# Patient Record
Sex: MALE | Race: WHITE | NOT HISPANIC OR LATINO | Employment: UNEMPLOYED | URBAN - METROPOLITAN AREA
[De-identification: names, ages, dates, MRNs, and addresses within clinical notes are randomized per-mention and may not be internally consistent; named-entity substitution may affect disease eponyms.]

---

## 2017-02-06 ENCOUNTER — TELEPHONE (OUTPATIENT)
Dept: NEUROSURGERY | Facility: CLINIC | Age: 32
End: 2017-02-06

## 2017-02-06 NOTE — TELEPHONE ENCOUNTER
----- Message from Barbie Guo sent at 2/6/2017 11:00 AM CST -----  Contact: pt [phone]  Pt would like to speak with nurse regarding his physical therapy and ct scan.pls call

## 2017-02-06 NOTE — TELEPHONE ENCOUNTER
Patient stated he's going to the emergency room is he feels like he cannot take his pain. Patient also would like a CT scan. Expressed to patient I will send a message to Luis Carlos and he will receive a call with Luis Carlos's decision. Patient verbalized understanding.

## 2017-02-09 ENCOUNTER — OFFICE VISIT (OUTPATIENT)
Dept: NEUROSURGERY | Facility: CLINIC | Age: 32
End: 2017-02-09
Payer: COMMERCIAL

## 2017-02-09 ENCOUNTER — HOSPITAL ENCOUNTER (OUTPATIENT)
Dept: RADIOLOGY | Facility: HOSPITAL | Age: 32
Discharge: HOME | End: 2017-02-09
Attending: NEUROLOGICAL SURGERY
Payer: COMMERCIAL

## 2017-02-09 VITALS
HEART RATE: 77 BPM | HEIGHT: 69 IN | DIASTOLIC BLOOD PRESSURE: 80 MMHG | WEIGHT: 185.63 LBS | SYSTOLIC BLOOD PRESSURE: 123 MMHG | BODY MASS INDEX: 27.49 KG/M2 | TEMPERATURE: 98 F

## 2017-02-09 DIAGNOSIS — M54.16 LUMBAR RADICULOPATHY, ACUTE: ICD-10-CM

## 2017-02-09 DIAGNOSIS — M62.838 MUSCLE SPASMS OF NECK: ICD-10-CM

## 2017-02-09 DIAGNOSIS — M47.22 CERVICAL SPONDYLOSIS WITH RADICULOPATHY: ICD-10-CM

## 2017-02-09 PROCEDURE — 1160F RVW MEDS BY RX/DR IN RCRD: CPT | Mod: S$GLB,,, | Performed by: PHYSICIAN ASSISTANT

## 2017-02-09 PROCEDURE — 71020 XR CHEST PA AND LATERAL: CPT | Mod: TC

## 2017-02-09 PROCEDURE — 99999 PR PBB SHADOW E&M-EST. PATIENT-LVL IV: CPT | Mod: PBBFAC,,, | Performed by: PHYSICIAN ASSISTANT

## 2017-02-09 PROCEDURE — 71020 XR CHEST PA AND LATERAL: CPT | Mod: 26,,, | Performed by: RADIOLOGY

## 2017-02-09 PROCEDURE — 99214 OFFICE O/P EST MOD 30 MIN: CPT | Mod: S$GLB,,, | Performed by: PHYSICIAN ASSISTANT

## 2017-02-09 RX ORDER — METHOCARBAMOL 750 MG/1
750 TABLET, FILM COATED ORAL 4 TIMES DAILY PRN
Qty: 60 TABLET | Refills: 0 | Status: SHIPPED | OUTPATIENT
Start: 2017-02-09 | End: 2018-01-15

## 2017-02-09 RX ORDER — HYDROCODONE BITARTRATE AND ACETAMINOPHEN 7.5; 325 MG/1; MG/1
1 TABLET ORAL EVERY 6 HOURS PRN
COMMUNITY
End: 2018-01-15

## 2017-02-09 NOTE — LETTER
February 13, 2017      Dave Gutierrez MD  [address] [facility] [address]           [facility] - Neurosurgery 7th Fl  [address]  [facility] [address]  Phone: [phone]          Patient: Keke Head   MR Number: [id number]   YOB: 1985   Date of Visit: 2/9/2017       Dear Dr. Dave Gutierrez:    Thank you for referring Keke Head to me for evaluation. Attached you will find relevant portions of my assessment and plan of care.    If you have questions, please do not hesitate to call me. I look forward to following Keke Head along with you.    Sincerely,    LEIGH Evans    Enclosure  CC:  No Recipients    If you would like to receive this communication electronically, please contact [email or web address] or [phone] to request more information on [employer] access.    For providers and/or their staff who would like to refer a patient to [facility], please contact us through our one-stop-shop provider referral line, [facility], at [phone].    If you feel you have received this communication in error or would no longer like to receive these types of communications, please e-mail [email or web address]
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
## 2017-02-22 ENCOUNTER — HOSPITAL ENCOUNTER (OUTPATIENT)
Dept: RADIOLOGY | Facility: HOSPITAL | Age: 32
Discharge: HOME | End: 2017-02-22
Attending: NEUROLOGICAL SURGERY
Payer: MEDICAID

## 2017-02-22 DIAGNOSIS — M47.22 CERVICAL SPONDYLOSIS WITH RADICULOPATHY: ICD-10-CM

## 2017-02-22 DIAGNOSIS — M54.16 LUMBAR RADICULOPATHY, ACUTE: ICD-10-CM

## 2017-02-22 PROCEDURE — 72131 CT LUMBAR SPINE W/O DYE: CPT | Mod: TC,PO

## 2017-02-22 PROCEDURE — 72125 CT NECK SPINE W/O DYE: CPT | Mod: TC,PO

## 2017-02-23 ENCOUNTER — TELEPHONE (OUTPATIENT)
Dept: NEUROSURGERY | Facility: CLINIC | Age: 32
End: 2017-02-23

## 2017-02-23 NOTE — TELEPHONE ENCOUNTER
I returned the pt wife call regarding imaging results that were completed on 2/22/17.  I informed  that the doctor has to review the imaging and findings . I also informed her that we generally do not give results over the phone. Once the physician reviews the imaging, the physician himself can release the results to be shown on [facility].  The pt wife stated they have not set up the account and verbally understands what was explained.

## 2017-02-23 NOTE — TELEPHONE ENCOUNTER
----- Message from Ted Gilman sent at 2/23/2017  9:19 AM CST -----  Contact: Lucia ( Spouse ) [phone]  Caller is calling to get the results of the CT, pls call

## 2017-03-09 ENCOUNTER — OFFICE VISIT (OUTPATIENT)
Dept: NEUROSURGERY | Facility: CLINIC | Age: 32
End: 2017-03-09
Payer: MEDICAID

## 2017-03-09 VITALS
HEIGHT: 69 IN | WEIGHT: 188 LBS | BODY MASS INDEX: 27.85 KG/M2 | SYSTOLIC BLOOD PRESSURE: 114 MMHG | DIASTOLIC BLOOD PRESSURE: 77 MMHG | HEART RATE: 66 BPM

## 2017-03-09 DIAGNOSIS — M62.838 MUSCLE SPASMS OF NECK: Primary | ICD-10-CM

## 2017-03-09 PROCEDURE — 99999 PR PBB SHADOW E&M-EST. PATIENT-LVL III: CPT | Mod: PBBFAC,,, | Performed by: PHYSICIAN ASSISTANT

## 2017-03-09 PROCEDURE — 99214 OFFICE O/P EST MOD 30 MIN: CPT | Mod: S$PBB,,, | Performed by: PHYSICIAN ASSISTANT

## 2017-03-09 PROCEDURE — 99213 OFFICE O/P EST LOW 20 MIN: CPT | Mod: PBBFAC | Performed by: PHYSICIAN ASSISTANT
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
## 2017-03-14 ENCOUNTER — CLINICAL SUPPORT (OUTPATIENT)
Dept: REHABILITATION | Facility: HOSPITAL | Age: 32
End: 2017-03-14
Attending: NEUROLOGICAL SURGERY
Payer: MEDICAID

## 2017-03-14 DIAGNOSIS — M54.50 BILATERAL LOW BACK PAIN WITHOUT SCIATICA, UNSPECIFIED CHRONICITY: ICD-10-CM

## 2017-03-14 DIAGNOSIS — M54.2 NECK PAIN, BILATERAL: Primary | ICD-10-CM

## 2017-03-14 PROCEDURE — 97110 THERAPEUTIC EXERCISES: CPT | Performed by: PHYSICAL MEDICINE & REHABILITATION

## 2017-03-14 PROCEDURE — 97162 PT EVAL MOD COMPLEX 30 MIN: CPT | Performed by: PHYSICAL MEDICINE & REHABILITATION
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
## 2017-03-21 ENCOUNTER — CLINICAL SUPPORT (OUTPATIENT)
Dept: REHABILITATION | Facility: HOSPITAL | Age: 32
End: 2017-03-21
Attending: NEUROLOGICAL SURGERY
Payer: MEDICAID

## 2017-03-21 DIAGNOSIS — M54.50 BILATERAL LOW BACK PAIN WITHOUT SCIATICA, UNSPECIFIED CHRONICITY: ICD-10-CM

## 2017-03-21 DIAGNOSIS — M54.2 NECK PAIN, BILATERAL: ICD-10-CM

## 2017-03-21 PROCEDURE — 97110 THERAPEUTIC EXERCISES: CPT | Performed by: PHYSICAL MEDICINE & REHABILITATION

## 2017-03-21 NOTE — PROGRESS NOTES
"Name: Keke Head  [facility] Number: [id number]  Diagnosis:   Encounter Diagnoses   Name Primary?    Neck pain, bilateral     Bilateral low back pain without sciatica, unspecified chronicity      Physician: Jayesh Pastor MD  Treatment Orders: PT Eval and Treat  Past Medical History:   Diagnosis Date    Neck pain        Precautions: None  Visit # authorized:  on 3/21/2017  Authorization period: 17  Plan of care expiration: 17    Start time: 4:15 PM  Stop Time: 5:00 PM    Timed     Procedure  Min     Units       TE   45 3                    Untimed     Procedure  Min  Units                      Subjective     Pt reports: Patient states low back feels good but c/o pain in center of neck C6-C7 area.  States did some yard work today.    Pain Scale: before treatment: 3/10 currently; after treatment: 2/10    Objective       TREATMENT   Therapeutic exercise: Keke received therapeutic exercises to develop flexibility, strength, and ROM for 45 minutes includin:1 with PT    Date 3/21/17  3/14/17   Visit     POC Exp Date  17   Face to Face      UT (S) (B)  2 x 30"  2 x 30"   Levator Scap (S) (B) 2 x 30" 2 x 30"   Supine Clasped hand (S)  10 x 5"   10 x 5"   Scap Retrac   2  X 10   1 x 10   Scap Retrac w/ER RTB   1 x 10    Trunk Rotation   X 10    Supine HSS   2 x 30"   2 x 30"   Prone quad/hip flexor (S) (B)  2 x 30"  2 x 30"   TA contrations 10 x 5"    TA w/bridging  x 10    Arch/Relax back extensors 10 x 5"    Upper Thoracic (S)  10 x 5"    Cervical Isometrics     Flexion  5 x 5"    Extension 5 x 5"    Side Bend  5 x 5"    Rotation 5 x 5"    CROM     Flexion    1 X 5    Extension   1 x 5    Rotation  1 X 5    Side Bend  1 X 5         Initials      VK   VK         Written Home Exercises Provided: cervical isometrics and AROM, Core and back extensor strengthening   Pt demo good understanding of the education provided. Keke demonstrated good return demonstration of activities. "     Pt. education:  · Posture reeducation, body mechanics, HEP,   · No spiritual or educational barriers to learning provided  · Pt has no cultural, educational or language barriers to learning provided.    Assessment     Patient performed above exercise program with verbal cueing for proper technique and tolerated addition of core and back extensor strengthening, cervical isometrics and AROM exercises.  Emphasizing strengthening to cervical, thoracic and lumbar regions.  Patient instructed to continue with HEP. Pt will continue to benefit from skilled outpatient physical therapy to address the remaining functional deficits, provide pt/family education, and to maximize pt's level of independence in the home and community environment. .     Goals:   Short Term Goals: 3 weeks  Increase range of motion 25% cervical and lumbar spines  Increase strength 1/2 muscle grade bilateral scapular  Increase postural awareness to normal  Be able to perform HEP with minimal cueing required  Improve functional impairment of carrying and handling objects to 30%      Long Term Goals: 6 weeks  Increase range of motion to 75%to 100% of full  Increase strength to 4+/5 to 5/5 with MMT  Improve scapular stabilization to normal  Restore normal sleep habits without disturbances due to pain  Restore ability to drive without increased pain  Restore ability to reach fully overhead without increased painRestore ability to ambulate with normal pain free gait  Walking for ADL and exercise will be restored without increased pain  Restore ability to stand for ADL without increased pain  Restore ability to sit without increased pain  Restore ability to climb stairs in a reciprocal manner with min to 0 increased pain and/or difficulty  Restore normal sleep habits without disturbances due to pain  Restore ability to perform ADL's, work, and household activities independently and without increased pain  Pt to be independent with HEP to improve ROM and  independence with ADL's  Improve functional impairment of carrying and handling objects to 20% or less    Anticipated barriers to physical therapy: None  Pt's spiritual, cultural and educational needs considered and pt agreeable to plan of care and goals        Plan   Continue with established Plan of Care towards PT goals.              Dolores Wu, PT

## 2017-04-07 ENCOUNTER — CLINICAL SUPPORT (OUTPATIENT)
Dept: REHABILITATION | Facility: HOSPITAL | Age: 32
End: 2017-04-07
Attending: NEUROLOGICAL SURGERY
Payer: MEDICAID

## 2017-04-07 DIAGNOSIS — M54.2 NECK PAIN, BILATERAL: ICD-10-CM

## 2017-04-07 DIAGNOSIS — M54.50 ACUTE BILATERAL LOW BACK PAIN WITHOUT SCIATICA: ICD-10-CM

## 2017-04-07 PROCEDURE — 97110 THERAPEUTIC EXERCISES: CPT | Performed by: PHYSICAL MEDICINE & REHABILITATION

## 2017-04-07 NOTE — PROGRESS NOTES
"Name: Keke Head  [facility] Number: [id number]  Diagnosis:   Encounter Diagnoses   Name Primary?   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 States not getting tingling in his arms anymore.    Pain Scale: before treatment: 3/10 currently; after treatment: 2/10    Objective       TREATMENT   Therapeutic exercise: Keke received therapeutic exercises to develop flexibility, strength, and ROM for 60 minutes includin:1 with PT    Date   4/7/17 3/21/17  3/14/17   Visit    3/50  2/50   1/50   POC Exp Date     17   Face to Face       UT (S) (B)   2 x 30"   2 x 30"  2 x 30"   Levator Scap (S) (B)   2 x 30" 2 x 30" 2 x 30"   Supine Clasped hand (S)   10 x 10"  10 x 5"   10 x 5"   Scap Retrac   2 x 10   2  X 10   1 x 10   Scap Retrac w/ER RTB   2 x 10   1 x 10    Trunk Rotation    1  X 10   X 10    Supine HSS     2 x 30"  2 x 30"   2 x 30"    LTR    1 x 10     Prone quad/hip flexor (S) (B)     2 x 30"  2 x 30"  2 x 30"   Press Ups      1 x 10     TA contrations      10 x 5" 10 x 5"    TA w/bridging       1 x 10  x 10    Pilates squeeze w/ pillow    10 x 5"     Arch/Relax back extensors   10 x 5" 10 x 5"    Upper Thoracic (S)   10 x 5"  10 x 5"    Seated forward flexion     1 x 5     Standing Back Extension    1 x 10     Cervical Isometrics      Flexion     10 x 5" 5 x 5"    Extension    10 x 5" " "5 x 5"    Side Bend     10 x 5"  5 x 5"    Rotation    10 x 5" 5 x 5"    CROM      Flexion      1 x 10    1 X 5    Extension      1 x 10   1 x 5    Rotation       1 x 10  1 X 5    Side Bend       1 x 10  1 X 5    Chin Tucks      1 x 10                 Initials   VK      VK   VK         Written Home Exercises Provided: Chin Tucks, LTR, Pilates core squeeze, back extension exercises.   Pt demo good understanding of the education provided. Keke demonstrated good return demonstration of activities.     Pt. education:  · Posture reeducation, body mechanics, HEP,   · No spiritual or educational barriers to learning provided  · Pt has no cultural, educational or language barriers to learning provided.    Assessment     Patient performed above exercise program with verbal cueing for proper technique and tolerated addition of core and back extensor strengthening, and chin tucks..  Emphasizing strengthening to cervical, thoracic and lumbar regions.  Patient instructed to continue with HEP and to maintain proper posture and body mechanics with ADL's and to keep activities at a moderate level. Pt will continue to benefit from skilled outpatient physical therapy to address the remaining functional deficits, provide pt/family education, and to maximize pt's level of independence in the home and community environment. .     Goals:   Short Term Goals: 3 weeks  Increase range of motion 25% cervical and lumbar spines  Increase strength 1/2 muscle grade bilateral scapular  Increase postural awareness to normal  Be able to perform HEP with minimal cueing required  Improve functional impairment of carrying and handling objects to 30%      Long Term Goals: 6 weeks  Increase range of motion to 75%to 100% of full  Increase strength to 4+/5 to 5/5 with MMT  Improve scapular stabilization to normal  Restore normal sleep habits without disturbances due to pain  Restore ability to drive without increased pain  Restore ability to reach fully " overhead without increased painRestore ability to ambulate with normal pain free gait  Walking for ADL and exercise will be restored without increased pain  Restore ability to stand for ADL without increased pain  Restore ability to sit without increased pain  Restore ability to climb stairs in a reciprocal manner with min to 0 increased pain and/or difficulty  Restore normal sleep habits without disturbances due to pain  Restore ability to perform ADL's, work, and household activities independently and without increased pain  Pt to be independent with HEP to improve ROM and independence with ADL's  Improve functional impairment of carrying and handling objects to 20% or less    Anticipated barriers to physical therapy: None  Pt's spiritual, cultural and educational needs considered and pt agreeable to plan of care and goals        Plan   Continue with established Plan of Care towards PT goals.              Dolores Wu, PT

## 2017-04-12 ENCOUNTER — CLINICAL SUPPORT (OUTPATIENT)
Dept: REHABILITATION | Facility: HOSPITAL | Age: 32
End: 2017-04-12
Attending: NEUROLOGICAL SURGERY
Payer: MEDICAID

## 2017-04-12 DIAGNOSIS — M54.2 NECK PAIN, BILATERAL: ICD-10-CM

## 2017-04-12 DIAGNOSIS — M54.50 ACUTE BILATERAL LOW BACK PAIN WITHOUT SCIATICA: ICD-10-CM

## 2017-04-12 PROCEDURE — 97110 THERAPEUTIC EXERCISES: CPT | Performed by: PHYSICAL MEDICINE & REHABILITATION

## 2017-04-12 NOTE — PROGRESS NOTES
"Name: Keke Head  [facility] Number: [id number]  Diagnosis:   Encounter Diagnoses   Name Primary?    Neck pain, bilateral     Acute bilateral low back pain without sciatica      Physician: Jayesh Pastor MD  Treatment Orders: PT Eval and Treat  Past Medical History:   Diagnosis Date    Neck pain        Precautions: None  Visit # authorized:  on 2017  Authorization period: 17  Plan of care expiration: 17    Start time: 10:00 AM  Stop Time: 11:00 AM    Timed     Procedure  Min     Units       TE x 3    45       3                    Untimed     Procedure  Min  Units                      Subjective     Pt reports: Patient states feeling soreness in upper back and his left upper trapezius muscle and 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     Pain Scale: before treatment: 3/10 currently; after treatment: 2/10    Objective       TREATMENT   Therapeutic exercise: Keke received therapeutic exercises to develop flexibility, strength, and ROM for 60 minutes includin:1 with PT  X 45 mins/15 mins supervised    Date   4/12/17   4/7/17 3/21/17  3/14/17   Visit   4/50    3/50  2/50   1/50   POC Exp Date  17   Face to Face        UT (S) (B)  2 x 30"   2 x 30"   2 x 30"  2 x 30"   Levator Scap (S) (B)    2 x 30"   2 x 30" 2 x 30" 2 x 30"   Supine Clasped hand (S)   10 x 10"   10 x 10"  10 x 5"   10 x 5"   Scap Retrac   2 x 10 RTB   2 x 10   2  X 10   1 x 10   Scap Retrac w/ER RTB   2 x 10   2 x 10   1 x 10    Trunk Rotation   1 x 10    1  X 10   X 10    Supine HSS     2 x 30"    2 x 30"  2 x 30"   2 x 30"    LTR   1  xm10    1 x 10     Prone quad/hip flexor (S) (B)    2  X 30"     2 x 30"  2 x 30"  2 x 30"   Press Ups    1 x 10      1 x 10     TA contrations   10 x 5"      10 x 5" 10 x 5"    TA w/bridging    10 x 5"       1 x 10  x 10    Pilates squeeze w/ pillow    10 x 5"    10 x 5"     Arch/Relax back extensors   2 x 10 x 5"   10 x 5" 10 x 5"  " "  Upper Thoracic (S)   10 x 5"    10 x 5"  10 x 5"    Seated forward flexion   1 x 5     1 x 5     Standing Back Extension   1 x 10    1 x 10     Cervical Isometrics       Flexion      10 x 5"    10 x 5" 5 x 5"    Extension     10 x 5"    10 x 5" 5 x 5"    Side Bend      10 x 5"    10 x 5"  5 x 5"    Rotation      10 x 5"    10 x 5" 5 x 5"    CROM       Flexion     1 x 10      1 x 10    1 X 5    Extension     1 x 10      1 x 10   1 x 5    Rotation     1 x 10       1 x 10  1 X 5    Side Bend     1 x 10       1 x 10  1 X 5    Chin Tucks      1 x 10      1 x 10                   Initials          VK   VK      VK   VK         Written Home Exercises Provided: Continue with current HEP  Pt demo good understanding of the education provided. Keke demonstrated good return demonstration of activities.     Pt. education:  · Posture reeducation, body mechanics, HEP,   · No spiritual or educational barriers to learning provided  · Pt has no cultural, educational or language barriers to learning provided.    Assessment     Patient performed above exercise program with verbal cueing for proper technique.   Emphasizing strengthening to cervical, thoracic and lumbar regions.  Patient instructed to continue with HEP and to maintain proper posture and body mechanics with ADL's.  Pt will continue to benefit from skilled outpatient physical therapy to address the remaining functional deficits, provide pt/family education, and to maximize pt's level of independence in the home and community environment. .     Goals:   Short Term Goals: 3 weeks  Increase range of motion 25% cervical and lumbar spines  Increase strength 1/2 muscle grade bilateral scapular  Increase postural awareness to normal  Be able to perform HEP with minimal cueing required  Improve functional impairment of carrying and handling objects to 30%      Long Term Goals: 6 weeks  Increase range of motion to 75%to 100% of full  Increase strength to 4+/5 to 5/5 with " MMT  Improve scapular stabilization to normal  Restore normal sleep habits without disturbances due to pain  Restore ability to drive without increased pain  Restore ability to reach fully overhead without increased painRestore ability to ambulate with normal pain free gait  Walking for ADL and exercise will be restored without increased pain  Restore ability to stand for ADL without increased pain  Restore ability to sit without increased pain  Restore ability to climb stairs in a reciprocal manner with min to 0 increased pain and/or difficulty  Restore normal sleep habits without disturbances due to pain  Restore ability to perform ADL's, work, and household activities independently and without increased pain  Pt to be independent with HEP to improve ROM and independence with ADL's  Improve functional impairment of carrying and handling objects to 20% or less    Anticipated barriers to physical therapy: None  Pt's spiritual, cultural and educational needs considered and pt agreeable to plan of care and goals        Plan   Continue with established Plan of Care towards PT goals.              Dolores Wu, PT

## 2017-04-25 ENCOUNTER — DOCUMENTATION ONLY (OUTPATIENT)
Dept: REHABILITATION | Facility: HOSPITAL | Age: 32
End: 2017-04-25

## 2017-04-25 DIAGNOSIS — M54.2 NECK PAIN, BILATERAL: ICD-10-CM

## 2017-04-25 DIAGNOSIS — M54.50 ACUTE BILATERAL LOW BACK PAIN WITHOUT SCIATICA: ICD-10-CM

## 2017-04-25 NOTE — PROGRESS NOTES
Mr. Head was seen in outpatient physical therapy for an initial evaluation on 3/14/17 for neck and back pain. Mr. Head has attended 3 follow up visits and has self discharged as he has not returned for further physical therapy. POC has ended and patient is discharged from physical therapy.        Dolores Wu, PT

## 2018-01-15 ENCOUNTER — HOSPITAL ENCOUNTER (EMERGENCY)
Facility: HOSPITAL | Age: 33
Discharge: HOME | End: 2018-01-15
Payer: MEDICAID

## 2018-01-15 VITALS
RESPIRATION RATE: 20 BRPM | TEMPERATURE: 99 F | HEART RATE: 101 BPM | HEIGHT: 69 IN | DIASTOLIC BLOOD PRESSURE: 75 MMHG | BODY MASS INDEX: 29.62 KG/M2 | SYSTOLIC BLOOD PRESSURE: 119 MMHG | OXYGEN SATURATION: 98 % | WEIGHT: 200 LBS

## 2018-01-15 DIAGNOSIS — J11.1 INFLUENZA: Primary | ICD-10-CM

## 2018-01-15 LAB
FLUAV AG SPEC QL IA: POSITIVE
FLUBV AG SPEC QL IA: NEGATIVE
SPECIMEN SOURCE: ABNORMAL

## 2018-01-15 PROCEDURE — 25000003 PHARM REV CODE 250: Performed by: NURSE PRACTITIONER

## 2018-01-15 PROCEDURE — 87400 INFLUENZA A/B EACH AG IA: CPT | Mod: 59

## 2018-01-15 PROCEDURE — 99284 EMERGENCY DEPT VISIT MOD MDM: CPT

## 2018-01-15 RX ORDER — ONDANSETRON 4 MG/1
4 TABLET, FILM COATED ORAL EVERY 6 HOURS
Qty: 12 TABLET | Refills: 0 | Status: SHIPPED | OUTPATIENT
Start: 2018-01-15

## 2018-01-15 RX ORDER — IBUPROFEN 600 MG/1
600 TABLET ORAL
Status: COMPLETED | OUTPATIENT
Start: 2018-01-15 | End: 2018-01-15

## 2018-01-15 RX ORDER — GUAIFENESIN, PSEUDOEPHEDRINE HYDROCHLORIDE 600; 60 MG/1; MG/1
1 TABLET, EXTENDED RELEASE ORAL 2 TIMES DAILY
Qty: 20 TABLET | Refills: 0 | Status: SHIPPED | OUTPATIENT
Start: 2018-01-15 | End: 2018-01-25

## 2018-01-15 RX ORDER — IBUPROFEN 600 MG/1
600 TABLET ORAL EVERY 6 HOURS PRN
Qty: 20 TABLET | Refills: 0 | Status: SHIPPED | OUTPATIENT
Start: 2018-01-15

## 2018-01-15 RX ORDER — DEXTROMETHORPHAN POLISTIREX 30 MG/5ML
60 SUSPENSION ORAL 2 TIMES DAILY
Qty: 150 ML | Refills: 0 | Status: SHIPPED | OUTPATIENT
Start: 2018-01-15 | End: 2018-01-25

## 2018-01-15 RX ADMIN — IBUPROFEN 600 MG: 600 TABLET, FILM COATED ORAL at 04:01

## 2018-01-15 NOTE — ED PROVIDER NOTES
Encounter Date: 1/15/2018       History     Chief Complaint   Patient presents with    Influenza     Pt states that he thinks he has the flu. C/o headache, chills, nausea, cough, congestion and fever. States that his son was dx with the flu. Symptoms started 2 weeks ago. Seen at Urgent Care then and prescribed a Zpak for sinus infection. Symptoms are worse now.      32-year-old male presents to emergency room with headache, cough, congestion, runny nose and generalized fatigue intermittently for the past 2 weeks.  States he was seen at urgent care 2 weeks ago given a Z-Gamaliel symptoms improved for a short period of time however symptoms return.  States his son was diagnosed with flu several days ago.  Patient reports nausea denies any vomiting.  States he felt hot and had chills but did not check temperature at home.          Review of patient's allergies indicates:  No Known Allergies  Past Medical History:   Diagnosis Date    Neck pain      Past Surgical History:   Procedure Laterality Date    EXTERNAL EAR SURGERY      FINGER SURGERY      FINGER SURGERY Left     Thumb    FINGER SURGERY Right     pinky    THUMB ARTHROSCOPY      TYMPANOPLASTY Left      History reviewed. No pertinent family history.  Social History   Substance Use Topics    Smoking status: Former Smoker     Quit date: 1/1/2008    Smokeless tobacco: Current User     Types: Chew    Alcohol use No     Review of Systems   Constitutional: Positive for chills and fever.   HENT: Positive for congestion. Negative for sore throat.    Respiratory: Positive for cough. Negative for shortness of breath.    Cardiovascular: Negative for chest pain.   Gastrointestinal: Positive for nausea. Negative for vomiting.   Genitourinary: Negative for dysuria.   Musculoskeletal: Negative for back pain.   Skin: Negative for rash.   Neurological: Negative for weakness.   Hematological: Does not bruise/bleed easily.   All other systems reviewed and are  negative.      Physical Exam     Initial Vitals [01/15/18 1559]   BP Pulse Resp Temp SpO2   120/74 105 (!) 22 100.1 °F (37.8 °C) 98 %      MAP       89.33         Physical Exam    Nursing note and vitals reviewed.  Constitutional: He appears well-developed and well-nourished. He is not diaphoretic. No distress.   febrile   HENT:   Head: Normocephalic and atraumatic.   Nose: Rhinorrhea present.   Eyes: Conjunctivae are normal.   Neck: Normal range of motion. Neck supple.   Cardiovascular: Regular rhythm. Tachycardia present.    Pulmonary/Chest: Breath sounds normal. No respiratory distress.   Abdominal: Soft. He exhibits no distension. There is no tenderness.   Musculoskeletal: Normal range of motion. He exhibits no tenderness.   Neurological: He is alert and oriented to person, place, and time. He has normal strength.   Skin: Skin is warm and dry. Capillary refill takes less than 2 seconds.   Psychiatric: He has a normal mood and affect. His behavior is normal. Judgment and thought content normal.         ED Course   Procedures  Labs Reviewed   INFLUENZA A AND B ANTIGEN - Abnormal; Notable for the following:        Result Value    Influenza A Ag, EIA Positive (*)     All other components within normal limits             Medical Decision Making:   Initial Assessment:   32-year-old male presents to emergency room with headache, cough, congestion, runny nose and generalized fatigue intermittently for the past 2 weeks.  States he was seen at urgent care 2 weeks ago given a Z-Gamaliel symptoms improved for a short period of time however symptoms return.  States his son was diagnosed with flu several days ago.  Patient reports nausea denies any vomiting.  States he felt hot and had chills but did not check temperature at home.  Differential Diagnosis:   URI, viral syndrome, RSV, pneumonia, bronchitis, croup, GERD, influenza, strep throat  Clinical Tests:   Lab Tests: Ordered and Reviewed  ED Management:  Based on patient's  presentation I highly suspect that this is a influenza-like virus. Patient given medications for symptom relief.  States he has Cheratussin Motrin and Tylenol at home.  Instructed to hydrate well.  Take Tylenol and Motrin as needed for fever.  Follow-up primary care doctor in 3-5 days.  If any symptoms worsen return to emergency room.  Patient verbalized understanding.                   ED Course      Clinical Impression:   The encounter diagnosis was Influenza.                           Deanne Zayas NP  01/15/18 3122

## 2018-01-16 ENCOUNTER — HOSPITAL ENCOUNTER (EMERGENCY)
Facility: HOSPITAL | Age: 33
Discharge: HOME | End: 2018-01-16
Payer: MEDICAID

## 2018-01-16 VITALS
SYSTOLIC BLOOD PRESSURE: 119 MMHG | BODY MASS INDEX: 29.62 KG/M2 | RESPIRATION RATE: 18 BRPM | HEIGHT: 69 IN | WEIGHT: 200 LBS | HEART RATE: 96 BPM | DIASTOLIC BLOOD PRESSURE: 79 MMHG | TEMPERATURE: 99 F | OXYGEN SATURATION: 99 %

## 2018-01-16 DIAGNOSIS — H66.002 ACUTE SUPPURATIVE OTITIS MEDIA OF LEFT EAR WITHOUT SPONTANEOUS RUPTURE OF TYMPANIC MEMBRANE, RECURRENCE NOT SPECIFIED: ICD-10-CM

## 2018-01-16 DIAGNOSIS — J10.1 INFLUENZA A: Primary | ICD-10-CM

## 2018-01-16 PROCEDURE — 25000003 PHARM REV CODE 250

## 2018-01-16 PROCEDURE — 99283 EMERGENCY DEPT VISIT LOW MDM: CPT

## 2018-01-16 RX ORDER — ACETAMINOPHEN 325 MG/1
650 TABLET ORAL
Status: COMPLETED | OUTPATIENT
Start: 2018-01-16 | End: 2018-01-16

## 2018-01-16 RX ORDER — AMOXICILLIN AND CLAVULANATE POTASSIUM 875; 125 MG/1; MG/1
1 TABLET, FILM COATED ORAL 2 TIMES DAILY
Qty: 20 TABLET | Refills: 0 | Status: SHIPPED | OUTPATIENT
Start: 2018-01-16 | End: 2018-01-26

## 2018-01-16 RX ORDER — PROMETHAZINE HYDROCHLORIDE AND DEXTROMETHORPHAN HYDROBROMIDE 6.25; 15 MG/5ML; MG/5ML
5 SYRUP ORAL EVERY 6 HOURS PRN
Qty: 180 ML | Refills: 0 | Status: SHIPPED | OUTPATIENT
Start: 2018-01-16 | End: 2018-01-26

## 2018-01-16 RX ADMIN — ACETAMINOPHEN 650 MG: 325 TABLET ORAL at 04:01

## 2018-01-16 NOTE — ED TRIAGE NOTES
Pt presents to ED with positive flu dx on yesterday. States that he feels worse. C/o headache, chest soreness with cough, generalized weakness.

## 2018-01-16 NOTE — ED PROVIDER NOTES
"Encounter Date: 1/16/2018       History     Chief Complaint   Patient presents with    Influenza     pt diagnosed with flu yesterday. states " i think i have pneumonia too"      32-year-old male presents to clinic with complaints of continuing body aches, frontal headache, nasal congestion, sinus pressure, dry cough, fever, chills.  Patient was diagnosed with influenza-type A yesterday.  Patient prescribed cough and congestion medication along with ibuprofen 600.  Patient has tried no medications today.  Patient denies nausea, vomiting, diarrhea, chest pain, shortness of breath, wheezing or dizziness.  Patient denies past history of asthma, COPD or pneumonia.          Review of patient's allergies indicates:  No Known Allergies  Past Medical History:   Diagnosis Date    Neck pain      Past Surgical History:   Procedure Laterality Date    EXTERNAL EAR SURGERY      FINGER SURGERY      FINGER SURGERY Left     Thumb    FINGER SURGERY Right     pinky    THUMB ARTHROSCOPY      TYMPANOPLASTY Left      History reviewed. No pertinent family history.  Social History   Substance Use Topics    Smoking status: Former Smoker     Quit date: 1/1/2008    Smokeless tobacco: Current User     Types: Chew    Alcohol use No     Review of Systems   Constitutional: Positive for chills, fatigue and fever. Negative for diaphoresis.   HENT: Positive for congestion and ear pain. Negative for ear discharge and sore throat.    Respiratory: Positive for cough. Negative for chest tightness, shortness of breath and wheezing.    Cardiovascular: Negative for chest pain.   Gastrointestinal: Negative for abdominal pain, diarrhea, nausea and vomiting.   Genitourinary: Negative for dysuria.   Musculoskeletal: Negative for back pain.   Skin: Negative for rash.   Neurological: Positive for headaches. Negative for dizziness, weakness and light-headedness.   Hematological: Does not bruise/bleed easily.   All other systems reviewed and are " negative.      Physical Exam     Initial Vitals [01/16/18 1431]   BP Pulse Resp Temp SpO2   135/74 101 18 100.1 °F (37.8 °C) 98 %      MAP       94.33         Physical Exam    Nursing note and vitals reviewed.  Constitutional: Vital signs are normal. He appears well-developed and well-nourished. He is active. No distress.   HENT:   Head: Normocephalic and atraumatic.   Right Ear: Tympanic membrane, external ear and ear canal normal.   Left Ear: External ear and ear canal normal. A middle ear effusion is present.   Nose: Rhinorrhea present.   Mouth/Throat: Uvula is midline, oropharynx is clear and moist and mucous membranes are normal.   Purulent effusion to the left ear with bulging, loss of landmarks and erythema but no perforation   Eyes: Conjunctivae, EOM and lids are normal.   Neck: Normal range of motion. Neck supple. No thyroid mass and no thyromegaly present.   Cardiovascular: Normal rate, regular rhythm and normal heart sounds.   No murmur heard.  Heart rate on exam is 96.   Pulmonary/Chest: Effort normal and breath sounds normal. No respiratory distress. He has no decreased breath sounds. He has no wheezes. He has no rales.   Musculoskeletal: Normal range of motion.   Lymphadenopathy:     He has no cervical adenopathy.   Neurological: He is alert.   Skin: Skin is warm, dry and intact. Capillary refill takes less than 2 seconds.         ED Course   Procedures  Labs Reviewed - No data to display          Medical Decision Making:   Initial Assessment:   32-year-old male presents to clinic with complaints of continuing body aches, frontal headache, nasal congestion, sinus pressure, dry cough, fever, chills.  Patient also complaining of left-sided ear pain that started today.  Patient denies tinnitus, hearing loss or ear drainage.  Patient was diagnosed with influenza-type A yesterday.  Patient prescribed cough and congestion medication along with ibuprofen 600.  Patient has tried no medications today.  Patient  denies nausea, vomiting, diarrhea, chest pain, shortness of breath, wheezing or dizziness.  Patient denies past history of asthma, COPD or pneumonia.    Physical exam reveals purulent effusion to the left ear with bulging, loss of landmarks and erythema but no perforation.  Lungs are clear to auscultation bilaterally with normal respiratory effort.  Differential Diagnosis:   Otitis media, otitis externa, referred pain to the ear from dental abnormality.  ED Management:  Patient diagnosed with influenza A and otitis media.  Patient prescribed cough medication and antibiotic.  Patient instructed to follow with PCP in 2 days.              Attending Attestation:     Physician Attestation Statement for NP/PA:   I discussed this assessment and plan of this patient with the NP/PA, but I did not personally examine the patient. The face to face encounter was performed by the NP/PA.                  ED Course      Clinical Impression:   The primary encounter diagnosis was Influenza A. A diagnosis of Acute suppurative otitis media of left ear without spontaneous rupture of tympanic membrane, recurrence not specified was also pertinent to this visit.                           LEIGH Claros  01/16/18 1622       Dave Santamaria MD  01/18/18 7527

## 2022-09-19 ENCOUNTER — OUTSIDE PLACE OF SERVICE (OUTPATIENT)
Dept: CARDIOLOGY | Facility: CLINIC | Age: 37
End: 2022-09-19
Payer: MEDICAID

## 2022-09-19 PROCEDURE — 93010 PR ELECTROCARDIOGRAM REPORT: ICD-10-PCS | Mod: ,,, | Performed by: INTERNAL MEDICINE

## 2022-09-19 PROCEDURE — 93010 ELECTROCARDIOGRAM REPORT: CPT | Mod: ,,, | Performed by: INTERNAL MEDICINE

## 2024-06-19 DIAGNOSIS — N50.819 TESTICULAR PAIN: Primary | ICD-10-CM

## 2024-07-27 ENCOUNTER — HOSPITAL ENCOUNTER (EMERGENCY)
Facility: HOSPITAL | Age: 39
Discharge: HOME | End: 2024-07-27
Attending: EMERGENCY MEDICINE
Payer: MEDICAID

## 2024-07-27 VITALS
DIASTOLIC BLOOD PRESSURE: 87 MMHG | HEIGHT: 69 IN | WEIGHT: 220 LBS | RESPIRATION RATE: 16 BRPM | BODY MASS INDEX: 32.58 KG/M2 | TEMPERATURE: 98 F | OXYGEN SATURATION: 99 % | SYSTOLIC BLOOD PRESSURE: 124 MMHG | HEART RATE: 77 BPM

## 2024-07-27 DIAGNOSIS — H65.02 ACUTE SEROUS OTITIS MEDIA WITHOUT RUPTURE, LEFT: Primary | ICD-10-CM

## 2024-07-27 PROCEDURE — 99284 EMERGENCY DEPT VISIT MOD MDM: CPT | Mod: ER

## 2024-07-27 RX ORDER — CETIRIZINE HYDROCHLORIDE 10 MG/1
10 TABLET ORAL DAILY
COMMUNITY
Start: 2024-07-27 | End: 2025-07-27

## 2024-07-27 RX ORDER — FLUTICASONE PROPIONATE 50 MCG
1 SPRAY, SUSPENSION (ML) NASAL 2 TIMES DAILY PRN
Qty: 15 G | Refills: 0 | Status: SHIPPED | OUTPATIENT
Start: 2024-07-27 | End: 2024-08-06